# Patient Record
Sex: FEMALE | Race: WHITE | NOT HISPANIC OR LATINO | URBAN - NONMETROPOLITAN AREA
[De-identification: names, ages, dates, MRNs, and addresses within clinical notes are randomized per-mention and may not be internally consistent; named-entity substitution may affect disease eponyms.]

---

## 2017-09-26 ENCOUNTER — TELEMEDICINE ORIGINATING SITE VISIT (OUTPATIENT)
Dept: MEDICAL GROUP | Facility: CLINIC | Age: 27
End: 2017-09-26
Payer: MEDICAID

## 2017-09-26 ENCOUNTER — TELEMEDICINE2 (OUTPATIENT)
Dept: MEDICAL GROUP | Facility: PHYSICIAN GROUP | Age: 27
End: 2017-09-26
Payer: MEDICAID

## 2017-09-26 ENCOUNTER — NON-PROVIDER VISIT (OUTPATIENT)
Dept: MEDICAL GROUP | Facility: CLINIC | Age: 27
End: 2017-09-26
Payer: MEDICAID

## 2017-09-26 VITALS
HEIGHT: 62 IN | WEIGHT: 129.5 LBS | OXYGEN SATURATION: 98 % | SYSTOLIC BLOOD PRESSURE: 103 MMHG | DIASTOLIC BLOOD PRESSURE: 68 MMHG | TEMPERATURE: 98.5 F | HEART RATE: 80 BPM | RESPIRATION RATE: 16 BRPM | BODY MASS INDEX: 23.83 KG/M2

## 2017-09-26 DIAGNOSIS — L40.9 PSORIASIS: ICD-10-CM

## 2017-09-26 DIAGNOSIS — Z30.41 ENCOUNTER FOR SURVEILLANCE OF CONTRACEPTIVE PILLS: ICD-10-CM

## 2017-09-26 DIAGNOSIS — F17.200 SMOKER: ICD-10-CM

## 2017-09-26 PROBLEM — Z30.40 ENCOUNTER FOR SURVEILLANCE OF CONTRACEPTIVES: Status: ACTIVE | Noted: 2017-09-26

## 2017-09-26 PROCEDURE — 36415 COLL VENOUS BLD VENIPUNCTURE: CPT | Performed by: FAMILY MEDICINE

## 2017-09-26 PROCEDURE — 99203 OFFICE O/P NEW LOW 30 MIN: CPT | Mod: GT | Performed by: NURSE PRACTITIONER

## 2017-09-26 RX ORDER — TRIAMCINOLONE ACETONIDE 1 MG/ML
LOTION TOPICAL
Qty: 1 BOTTLE | Refills: 3 | Status: SHIPPED | OUTPATIENT
Start: 2017-09-26 | End: 2019-04-02

## 2017-09-26 RX ORDER — TRIAMCINOLONE ACETONIDE 1 MG/G
1 CREAM TOPICAL 2 TIMES DAILY
Qty: 1 TUBE | Refills: 3 | Status: SHIPPED | OUTPATIENT
Start: 2017-09-26 | End: 2019-04-02

## 2017-09-26 ASSESSMENT — PATIENT HEALTH QUESTIONNAIRE - PHQ9: CLINICAL INTERPRETATION OF PHQ2 SCORE: 0

## 2017-09-26 NOTE — PROGRESS NOTES
Chief Complaint   Patient presents with   • Psoriasis       HISTORY OF PRESENT ILLNESS: Patient is a 26 y.o. Female new patient,  who presents today to discuss:   Verified Identification with patient.  Secured video conference with  presenter in [place], [place]        Encounter for surveillance of contraceptives  Patient was on birth control in the past. Patient had an episode of pelvic pain and went to the [facility]. Eventually she had to go to [place] and was hospitalized for 2 nights. There was suspicion of appendicitis but this was ruled out and patient had generalized pelvic pain related to her ovaries.  Patient has a prescription for oral contraceptives at the pharmacy that may be out of date. Last menstrual period was yesterday but in reality she has had 2 periods in one month. I have asked her to have a repeat pregnancy test.    Psoriasis  Patient diagnosed with psoriasis as a teenager. Recently the psoriatic patches and redness have really increased. She has scattered red plaques abdomen, torso and back. There extremely pruritic and causing her a lot of distress. She remembers having a cream that she applied that cutdown on the itching. She has never had formal evaluation of her psoriasis. She has not deployed the over-the-counter medicines which might include coal tar shampoo and creams. As her scalp is affected as well. She has no secondary infections. She is a smoker, drinks alcohol and smokes pot discuss healthier habits that may decrease the nature of the psoriatic plaques. Stress reduction. Exposure to sun is helpful.    Smoker  Patient smokes a half a pack of cigarettes a day. She has smoked for over 5 years. She smokes marijuana occasionally and drinks occasionally. Discussed elimination or decreasing this habit secondary or psoriasis. Offered strategy and help        Allergies:Review of patient's allergies indicates no known allergies.    Current Outpatient Prescriptions Ordered in [facility]  "  Medication Sig Dispense Refill   • triamcinolone (KENALOG) 0.1 % lotion Apply to back and torso twice daily. 1 Bottle 3   • coal tar shampoo (PANCHO-GEL TAR) 0.5 % Shampoo Use nightly as directed. 1 Bottle 3   • triamcinolone acetonide (KENALOG) 0.1 % Cream Apply 1 Application to affected area(s) 2 times a day. 1 Tube 3     No current Epic-ordered facility-administered medications on file.        Past Medical History:   Diagnosis Date   • Pelvic pain    • Psoriasis     age 17       Social History   Substance Use Topics   • Smoking status: Current Every Day Smoker     Packs/day: 0.50     Years: 10.00     Types: Cigarettes   • Smokeless tobacco: Never Used   • Alcohol use 2.4 oz/week     4 Cans of beer per week       Family Status   Relation Status   • Mother Alive   • Father Alive   • Sister Alive   • Brother Alive   • Sister Alive   • Brother Alive     Family History   Problem Relation Age of Onset   • Diabetes Father        ROS: As documented in my HPI      Exam:  Blood pressure 103/68, pulse 80, temperature 36.9 °C (98.5 °F), resp. rate 16, height 1.575 m (5' 2\"), weight 58.7 kg (129 lb 8 oz), SpO2 98 %.  General:  Well nourished, well developed female in NAD  Head: red raised - silvery plaques base of scalp  Neck: Supple. Symmetric Thyroid visualized during exam.   Pulmonary:  Normal effort.   Cardiovascular: Regular rate   Abdomen: Soft nontender, normal bowel sounds  Scattered red, psoriasis plaques to abdomen,torso and back.  Extremities: no clubbing, cyanosis, or edema. Isolated plaques to lower legs.   Psych: Alert and oriented x3. Normal mood and affect.   Neurological: No focal deficits    Please note that this dictation was created using voice recognition software. I have made every reasonable attempt to correct obvious errors, but I expect that there are errors of grammar and possibly content that I did not discover before finalizing the note.    Assessment/Plan:  1. Psoriasis  CBC WITH DIFFERENTIAL    " COMP METABOLIC PANEL    VITAMIN D,25 HYDROXY    REFERRAL TO DERMATOLOGY    triamcinolone (KENALOG) 0.1 % lotion    coal tar shampoo (PANCHO-GEL TAR) 0.5 % Shampoo    triamcinolone acetonide (KENALOG) 0.1 % Cream   2. Smoker  CBC WITH DIFFERENTIAL   3. Encounter for surveillance of contraceptive pills  POCT Pregnancy       Return in 2 months for follow up.

## 2017-09-26 NOTE — NON-PROVIDER
Marti Schmitz is a 26 y.o. female here for a non-provider visit for venipuncture.    If abnormal was an in office provider notified today (if so, indicate provider)? Yes  Routed to PCP? Yes

## 2017-10-01 ENCOUNTER — TELEPHONE (OUTPATIENT)
Dept: MEDICAL GROUP | Facility: PHYSICIAN GROUP | Age: 27
End: 2017-10-01

## 2017-10-31 ENCOUNTER — TELEMEDICINE ORIGINATING SITE VISIT (OUTPATIENT)
Dept: MEDICAL GROUP | Facility: CLINIC | Age: 27
End: 2017-10-31
Payer: MEDICAID

## 2017-10-31 ENCOUNTER — TELEMEDICINE2 (OUTPATIENT)
Dept: MEDICAL GROUP | Facility: PHYSICIAN GROUP | Age: 27
End: 2017-10-31
Payer: MEDICAID

## 2017-10-31 ENCOUNTER — NON-PROVIDER VISIT (OUTPATIENT)
Dept: MEDICAL GROUP | Facility: CLINIC | Age: 27
End: 2017-10-31
Payer: MEDICAID

## 2017-10-31 VITALS
OXYGEN SATURATION: 95 % | TEMPERATURE: 98.8 F | WEIGHT: 123 LBS | HEIGHT: 62 IN | SYSTOLIC BLOOD PRESSURE: 107 MMHG | HEART RATE: 85 BPM | DIASTOLIC BLOOD PRESSURE: 59 MMHG | BODY MASS INDEX: 22.63 KG/M2 | RESPIRATION RATE: 16 BRPM

## 2017-10-31 DIAGNOSIS — R53.82 CHRONIC FATIGUE: ICD-10-CM

## 2017-10-31 DIAGNOSIS — F17.200 SMOKER: ICD-10-CM

## 2017-10-31 PROCEDURE — 99214 OFFICE O/P EST MOD 30 MIN: CPT | Mod: GT | Performed by: NURSE PRACTITIONER

## 2017-10-31 PROCEDURE — 36415 COLL VENOUS BLD VENIPUNCTURE: CPT | Performed by: NURSE PRACTITIONER

## 2017-10-31 RX ORDER — BUPROPION HYDROCHLORIDE 200 MG/1
200 TABLET, EXTENDED RELEASE ORAL 2 TIMES DAILY
Qty: 60 TAB | Refills: 1 | Status: SHIPPED | OUTPATIENT
Start: 2017-10-31 | End: 2019-04-02

## 2017-10-31 ASSESSMENT — PATIENT HEALTH QUESTIONNAIRE - PHQ9: CLINICAL INTERPRETATION OF PHQ2 SCORE: 0

## 2017-10-31 NOTE — PROGRESS NOTES
Chief Complaint   Patient presents with   • Follow-Up       HISTORY OF PRESENT ILLNESS: Patient is a 26 y.o. female LYDIA,  patient, who presents today to discuss:   Verified Identification with patient.  Secured video conference with RN presenter in [place], [place]        Smoker  Patient is interested in quitting. Patient has not formally quit on her own other when she was incarcerated.  She had to go cold turkey at that time and quit smoking for about 4 months. She smokes to relax does have appear group. We talked about the use of medication which would include bupropion twice a day. Side effects were discussed.. Quit date and expectations of smoking cessation were also reviewed.The patient has a history of tobacco abuse. At the current time we do not have any end organ damage obviously caused by the tobacco abuse. Today I engaged in a 5 minute discussion regarding the dangers of continued tobacco use, including cancer and localized and generalized medical problems. I informed the patient that there are many current interventions that are successful in tobacco cessation including counseling and pharmacological intervention.     Chronic fatigue  Patient complains of increased fatigue for about 1-2 months. Patient last menstrual period was last week. Patient works part time - 3 nights a week. No chest pain. No sob. Having some problems with carpal tunnel problems.   Patient questioning healthy behaviors. Reviewed the necessity of eating 5 fruits and vegetables, increasing water decreasing animal proteins and dairy products to reduce inflammation. Patient does have a history of some joint swelling to provide increased lab work to determine if any problems there as she does have arthritis.        Allergies:Review of patient's allergies indicates no known allergies.    Current Outpatient Prescriptions Ordered in [facility]   Medication Sig Dispense Refill   • buPROPion (WELLBUTRIN SR) 200 MG SR tablet Take 1 Tab by mouth 2  "times a day. 60 Tab 1   • triamcinolone (KENALOG) 0.1 % lotion Apply to back and torso twice daily. 1 Bottle 3   • coal tar shampoo (PANCHO-GEL TAR) 0.5 % Shampoo Use nightly as directed. 1 Bottle 3   • triamcinolone acetonide (KENALOG) 0.1 % Cream Apply 1 Application to affected area(s) 2 times a day. 1 Tube 3     No current Epic-ordered facility-administered medications on file.        Past Medical History:   Diagnosis Date   • Pelvic pain    • Psoriasis     age 17       Social History   Substance Use Topics   • Smoking status: Current Every Day Smoker     Packs/day: 0.50     Years: 10.00     Types: Cigarettes   • Smokeless tobacco: Never Used   • Alcohol use 2.4 oz/week     4 Cans of beer per week       Family Status   Relation Status   • Mother Alive   • Father Alive   • Sister Alive   • Brother Alive   • Sister Alive   • Brother Alive     Family History   Problem Relation Age of Onset   • Diabetes Father        ROS: As documented in my HPI      Exam:  Blood pressure 107/59, pulse 85, temperature 37.1 °C (98.8 °F), resp. rate 16, height 1.575 m (5' 2\"), weight 55.8 kg (123 lb), SpO2 95 %.  General:  Well nourished, well developed female in NAD  Head: No lesions, Non tender scalp  Neck: Supple.   Pulmonary:  Normal effort.   Cardiovascular: Regular rate  Extremities: no clubbing, cyanosis, or edema.  Psych: Alert and oriented x3. Normal mood and affect.   Neurological: No focal deficits    Please note that this dictation was created using voice recognition software. I have made every reasonable attempt to correct obvious errors, but I expect that there are errors of grammar and possibly content that I did not discover before finalizing the note.    Assessment/Plan:  1. Chronic fatigue  URIC A+ESR-LAURA+KATHIE+RA QN+CR    TSH+FREE T4    VITAMIN B12   2. Smoker  buPROPion (WELLBUTRIN SR) 200 MG SR tablet        Patient to return in one month.  "

## 2017-10-31 NOTE — ASSESSMENT & PLAN NOTE
Patient is interested in quitting. Patient has not formally quit on her own other when she was incarcerated.  She had to go cold turkey at that time and quit smoking for about 4 months. She smokes to relax does have appear group. We talked about the use of medication which would include bupropion twice a day. Side effects were discussed.. Quit date and expectations of smoking cessation were also reviewed.The patient has a history of tobacco abuse. At the current time we do not have any end organ damage obviously caused by the tobacco abuse. Today I engaged in a 5 minute discussion regarding the dangers of continued tobacco use, including cancer and localized and generalized medical problems. I informed the patient that there are many current interventions that are successful in tobacco cessation including counseling and pharmacological intervention.

## 2017-10-31 NOTE — ASSESSMENT & PLAN NOTE
Patient complains of increased fatigue for about 1-2 months. Patient last menstrual period was last week. Patient works part time - 3 nights a week. No chest pain. No sob. Having some problems with carpal tunnel problems.   Patient questioning healthy behaviors. Reviewed the necessity of eating 5 fruits and vegetables, increasing water decreasing animal proteins and dairy products to reduce inflammation. Patient does have a history of some joint swelling to provide increased lab work to determine if any problems there as she does have arthritis.

## 2017-10-31 NOTE — PROGRESS NOTES
Marti Schmitz is a 26 y.o. female here for a non-provider visit for Venipuncture    If abnormal was an in office provider notified upon receipt of results (if so, indicate provider)? Yes  Routed to PCP? Yes

## 2017-11-02 ENCOUNTER — TELEPHONE (OUTPATIENT)
Dept: MEDICAL GROUP | Facility: CLINIC | Age: 27
End: 2017-11-02

## 2017-11-06 ENCOUNTER — APPOINTMENT (RX ONLY)
Dept: URBAN - METROPOLITAN AREA CLINIC 103 | Facility: CLINIC | Age: 27
Setting detail: DERMATOLOGY
End: 2017-11-06

## 2017-11-06 DIAGNOSIS — D485 NEOPLASM OF UNCERTAIN BEHAVIOR OF SKIN: ICD-10-CM

## 2017-11-06 DIAGNOSIS — L40.0 PSORIASIS VULGARIS: ICD-10-CM

## 2017-11-06 DIAGNOSIS — Z12.83 ENCOUNTER FOR SCREENING FOR MALIGNANT NEOPLASM OF SKIN: ICD-10-CM

## 2017-11-06 PROBLEM — D48.5 NEOPLASM OF UNCERTAIN BEHAVIOR OF SKIN: Status: ACTIVE | Noted: 2017-11-06

## 2017-11-06 PROCEDURE — ? ORDER TESTS

## 2017-11-06 PROCEDURE — 99203 OFFICE O/P NEW LOW 30 MIN: CPT | Mod: 25

## 2017-11-06 PROCEDURE — 11101: CPT

## 2017-11-06 PROCEDURE — ? BIOPSY BY PUNCH METHOD

## 2017-11-06 PROCEDURE — ? COUNSELING

## 2017-11-06 PROCEDURE — 11100: CPT

## 2017-11-06 ASSESSMENT — LOCATION DETAILED DESCRIPTION DERM
LOCATION DETAILED: LEFT SUPERIOR MEDIAL MIDBACK
LOCATION DETAILED: UPPER STERNUM
LOCATION DETAILED: RIGHT LATERAL ABDOMEN
LOCATION DETAILED: EPIGASTRIC SKIN
LOCATION DETAILED: UPPER STERNUM
LOCATION DETAILED: RIGHT MEDIAL UPPER BACK
LOCATION DETAILED: LEFT SUPERIOR MEDIAL MIDBACK
LOCATION DETAILED: EPIGASTRIC SKIN
LOCATION DETAILED: LEFT LATERAL ABDOMEN
LOCATION DETAILED: RIGHT LATERAL ABDOMEN
LOCATION DETAILED: RIGHT MEDIAL UPPER BACK
LOCATION DETAILED: LEFT LATERAL ABDOMEN

## 2017-11-06 ASSESSMENT — LOCATION SIMPLE DESCRIPTION DERM
LOCATION SIMPLE: CHEST
LOCATION SIMPLE: CHEST
LOCATION SIMPLE: RIGHT UPPER BACK
LOCATION SIMPLE: RIGHT UPPER BACK
LOCATION SIMPLE: LEFT LOWER BACK
LOCATION SIMPLE: ABDOMEN
LOCATION SIMPLE: ABDOMEN
LOCATION SIMPLE: LEFT LOWER BACK

## 2017-11-06 ASSESSMENT — BSA PSORIASIS: % BODY COVERED IN PSORIASIS: 30

## 2017-11-06 ASSESSMENT — LOCATION ZONE DERM
LOCATION ZONE: TRUNK
LOCATION ZONE: TRUNK

## 2017-11-06 ASSESSMENT — PGA PSORIASIS: PGA PSORIASIS: MODERATE (MODERATE PLAQUE ELEVATION, MODERATE ERYTHEMA, COARSE SCALE PREDOMINATES)

## 2017-11-06 NOTE — PROCEDURE: BIOPSY BY PUNCH METHOD
Billing Type: [employer]
Post-Care Instructions: I reviewed with the patient in detail post-care instructions. Patient is to keep the biopsy site dry overnight, and then apply bacitracin twice daily until healed. Patient may apply hydrogen peroxide soaks to remove any crusting.
X Depth Of Punch In Cm (Optional): 0
Lab: [id number]
Hemostasis: None
Notification Instructions: Patient will be notified of biopsy results. However, patient instructed to call the office if not contacted within 2 weeks.
Wound Care: Bacitracin
Anesthesia Type: 1% lidocaine with epinephrine
Bill For Surgical Tray: no
Body Location Override (Optional - Billing Will Still Be Based On Selected Body Map Location If Applicable): Right Abdomen
Suture Removal: 14 days
Consent: Written consent was obtained and risks were reviewed including but not limited to scarring, infection, bleeding, scabbing, incomplete removal, nerve damage and allergy to anesthesia.
Home Suture Removal Text: Patient was provided a home suture removal kit and will remove their sutures at home. If they have any questions or difficulties they will call the office.
Detail Level: Detailed
Path Notes (To The Dermatopathologist): Size: 3mm R/O: Psoriasis vs Nummular vs Atopic
Anesthesia Volume In Cc (Will Not Render If 0): 0.5
Dressing: bandage
Biopsy Type: H and E
Punch Size In Mm: 3
Epidermal Sutures: 4-0 Nylon
Body Location Override (Optional - Billing Will Still Be Based On Selected Body Map Location If Applicable): Left Abdomen
Home Suture Removal Text: Patient was provided a home suture removal kit and will remove their sutures at home.  If they have any questions or difficulties they will call the office.
Lab: [id number]
Billing Type: Third-Party Bill

## 2017-11-20 ENCOUNTER — APPOINTMENT (RX ONLY)
Dept: URBAN - METROPOLITAN AREA CLINIC 103 | Facility: CLINIC | Age: 27
Setting detail: DERMATOLOGY
End: 2017-11-20

## 2017-11-20 DIAGNOSIS — L40.0 PSORIASIS VULGARIS: ICD-10-CM

## 2017-11-20 PROCEDURE — ? PRESCRIPTION

## 2017-11-20 PROCEDURE — 99213 OFFICE O/P EST LOW 20 MIN: CPT

## 2017-11-20 PROCEDURE — ? COUNSELING

## 2017-11-20 RX ORDER — ADALIMUMAB 40MG/0.8ML
KIT SUBCUTANEOUS Q2WEEKS
Qty: 3 | Refills: 0 | Status: ERX | COMMUNITY
Start: 2017-11-20

## 2017-11-20 RX ADMIN — ADALIMUMAB: KIT at 23:44

## 2017-11-20 ASSESSMENT — LOCATION ZONE DERM
LOCATION ZONE: TRUNK
LOCATION ZONE: TRUNK

## 2017-11-20 ASSESSMENT — LOCATION SIMPLE DESCRIPTION DERM
LOCATION SIMPLE: LEFT LOWER BACK
LOCATION SIMPLE: ABDOMEN
LOCATION SIMPLE: RIGHT LOWER BACK
LOCATION SIMPLE: ABDOMEN

## 2017-11-20 ASSESSMENT — LOCATION DETAILED DESCRIPTION DERM
LOCATION DETAILED: EPIGASTRIC SKIN
LOCATION DETAILED: LEFT SUPERIOR LATERAL LOWER BACK
LOCATION DETAILED: RIGHT SUPERIOR LATERAL MIDBACK
LOCATION DETAILED: RIGHT LATERAL ABDOMEN
LOCATION DETAILED: EPIGASTRIC SKIN
LOCATION DETAILED: PERIUMBILICAL SKIN
LOCATION DETAILED: LEFT LATERAL ABDOMEN
LOCATION DETAILED: LEFT SUPERIOR MEDIAL MIDBACK
LOCATION DETAILED: RIGHT SUPERIOR LATERAL LOWER BACK

## 2018-06-19 ENCOUNTER — TELEMEDICINE2 (OUTPATIENT)
Dept: MEDICAL GROUP | Facility: PHYSICIAN GROUP | Age: 28
End: 2018-06-19
Payer: MEDICAID

## 2018-06-19 ENCOUNTER — NON-PROVIDER VISIT (OUTPATIENT)
Dept: MEDICAL GROUP | Facility: CLINIC | Age: 28
End: 2018-06-19
Payer: MEDICAID

## 2018-06-19 VITALS
HEIGHT: 62 IN | RESPIRATION RATE: 16 BRPM | TEMPERATURE: 98.8 F | OXYGEN SATURATION: 98 % | WEIGHT: 117 LBS | DIASTOLIC BLOOD PRESSURE: 65 MMHG | SYSTOLIC BLOOD PRESSURE: 110 MMHG | HEART RATE: 96 BPM | BODY MASS INDEX: 21.53 KG/M2

## 2018-06-19 DIAGNOSIS — L40.9 PSORIASIS: ICD-10-CM

## 2018-06-19 DIAGNOSIS — F17.200 SMOKER: ICD-10-CM

## 2018-06-19 DIAGNOSIS — N94.3 PMS (PREMENSTRUAL SYNDROME): ICD-10-CM

## 2018-06-19 DIAGNOSIS — G56.01 CARPAL TUNNEL SYNDROME OF RIGHT WRIST: ICD-10-CM

## 2018-06-19 DIAGNOSIS — F39 MOOD DISORDER (HCC): ICD-10-CM

## 2018-06-19 PROCEDURE — 29125 APPL SHORT ARM SPLINT STATIC: CPT | Performed by: NURSE PRACTITIONER

## 2018-06-19 PROCEDURE — 99214 OFFICE O/P EST MOD 30 MIN: CPT | Mod: 25 | Performed by: NURSE PRACTITIONER

## 2018-06-19 RX ORDER — NORGESTIMATE AND ETHINYL ESTRADIOL 7DAYSX3 LO
1 KIT ORAL DAILY
Qty: 28 TAB | Refills: 2 | Status: SHIPPED | OUTPATIENT
Start: 2018-06-19 | End: 2019-04-02

## 2018-06-19 RX ORDER — ONDANSETRON 2 MG/ML
INJECTION INTRAMUSCULAR; INTRAVENOUS
COMMUNITY
Start: 2018-05-11 | End: 2019-04-02

## 2018-06-19 RX ORDER — SODIUM CHLORIDE 9 MG/ML
INJECTION, SOLUTION INTRAVENOUS
COMMUNITY
Start: 2018-05-11 | End: 2019-04-02

## 2018-06-19 NOTE — ASSESSMENT & PLAN NOTE
Patient continues to smoke cigarettes which puts her at risk if she goes on birth control pills.  The risk of DVT formation of clots, hypertension and heart disease discuss with the use of birth control pills and smoking.

## 2018-06-19 NOTE — ASSESSMENT & PLAN NOTE
Patient complains about wrist pain.  Right handed.  Patient states that her hand often will be stiff in the morning and feel numb especially in the fingertips.  She has not done any type of treatment which would include an ibuprofen or Aleve, ice or immobilization.  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  She is agreed to use ibuprofen and wear a wrist splint at night and see how this will work over the next month.

## 2018-06-19 NOTE — NON-PROVIDER
Marti SAMMI Scmhitz is a 27 y.o. female here for a non-provider visit for Splint placement to (R) wrist

## 2018-06-19 NOTE — ASSESSMENT & PLAN NOTE
Patient's rash and psoriasis has really improved.  Patient saw a dermatologist.  Tremendous improvement.  Has prescription cream on hand if needed.
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
## 2019-04-02 ENCOUNTER — OFFICE VISIT (OUTPATIENT)
Dept: MEDICAL GROUP | Facility: CLINIC | Age: 29
End: 2019-04-02
Payer: MEDICAID

## 2019-04-02 VITALS
TEMPERATURE: 97.8 F | RESPIRATION RATE: 16 BRPM | DIASTOLIC BLOOD PRESSURE: 61 MMHG | WEIGHT: 106 LBS | HEART RATE: 90 BPM | BODY MASS INDEX: 19.51 KG/M2 | HEIGHT: 62 IN | OXYGEN SATURATION: 94 % | SYSTOLIC BLOOD PRESSURE: 100 MMHG

## 2019-04-02 DIAGNOSIS — K02.9 DENTAL CARIES: ICD-10-CM

## 2019-04-02 PROCEDURE — 99214 OFFICE O/P EST MOD 30 MIN: CPT | Performed by: PHYSICIAN ASSISTANT

## 2019-04-02 RX ORDER — AMOXICILLIN 875 MG/1
875 TABLET, COATED ORAL 2 TIMES DAILY
Qty: 20 TAB | Refills: 0 | Status: SHIPPED | OUTPATIENT
Start: 2019-04-02

## 2019-04-02 RX ORDER — IBUPROFEN 800 MG/1
800 TABLET ORAL EVERY 8 HOURS PRN
Qty: 30 TAB | Refills: 0 | Status: SHIPPED | OUTPATIENT
Start: 2019-04-02

## 2019-04-02 NOTE — PROGRESS NOTES
"cc:  Tooth pain    Subjective:     Marti Schmitz is a 28 y.o. female presenting for tooth pain      Patient has had pain in the lower right jaw for 5 days.  She states she is having pain in the bottom 3 teeth.  She states that she does have a dentist.  She has had problems with her teeth in the past.  She feels like a filling fell out and so is having a sharp nerve pain radiating up to her ear.  She has had lest pain today.  The pain has been constant.  Ibuprofen will help for an hour.  She has not made an appt with her dentist.  She states the pain moved into her eye and her nose.    Review of systems:  See above. Denies any other symptoms unless previously indicated.        Current Outpatient Prescriptions:   •  amoxicillin (AMOXIL) 875 MG tablet, Take 1 Tab by mouth 2 times a day., Disp: 20 Tab, Rfl: 0  •  ibuprofen (MOTRIN) 800 MG Tab, Take 1 Tab by mouth every 8 hours as needed., Disp: 30 Tab, Rfl: 0    Allergies, past medical history, past surgical history, family history, social history reviewed and updated    Objective:     Vitals: /61 (BP Location: Right arm, Patient Position: Sitting)   Pulse 90   Temp 36.6 °C (97.8 °F) (Temporal)   Resp 16   Ht 1.575 m (5' 2\")   Wt 48.1 kg (106 lb)   SpO2 94%   BMI 19.39 kg/m²   General: Alert, pleasant, NAD  HEENT: Normocephalic.  EOMI, no icterus or pallor.  Conjunctivae and lids normal. External ears normal. Oropharynx non-erythematous, mucous membranes moist.  Multiple dental caries bilaterally upper and lower jaw.  No significant swelling.  Neck supple.   Heart: Regular rate and rhythm.  S1 and S2 normal.  No murmurs appreciated.  Respiratory: Normal respiratory effort.  Clear to auscultation bilaterally.  Abdomen: Not obese  Skin: Warm, dry, no rashes.  Musculoskeletal: Gait is normal.  Moves all extremities well.  Neuro: Cranial nerves II through XII intact.  No focal deficits noted.  Psych:  Affect/mood is normal, judgement is good, memory is " intact, grooming is appropriate.    Assessment/Plan:     Marti was seen today for dental pain.    Diagnoses and all orders for this visit:    Dental caries  -     amoxicillin (AMOXIL) 875 MG tablet; Take 1 Tab by mouth 2 times a day.  -     ibuprofen (MOTRIN) 800 MG Tab; Take 1 Tab by mouth every 8 hours as needed.    Possibly infected.  Will start antibiotics and ibuprofen.  Patient will make an appointment with her dentist.  No improvement in the next 48-72 hours, patient to contact clinic.    No Follow-up on file.